# Patient Record
Sex: MALE | Race: BLACK OR AFRICAN AMERICAN | Employment: OTHER | URBAN - METROPOLITAN AREA
[De-identification: names, ages, dates, MRNs, and addresses within clinical notes are randomized per-mention and may not be internally consistent; named-entity substitution may affect disease eponyms.]

---

## 2017-01-01 ENCOUNTER — HOSPITAL ENCOUNTER (EMERGENCY)
Age: 82
Discharge: HOME | End: 2017-01-01
Attending: EMERGENCY MEDICINE
Payer: MEDICARE

## 2017-01-01 ENCOUNTER — APPOINTMENT (OUTPATIENT)
Dept: GENERAL RADIOLOGY | Age: 82
End: 2017-01-01
Payer: MEDICARE

## 2017-01-01 VITALS
WEIGHT: 192 LBS | HEIGHT: 67 IN | SYSTOLIC BLOOD PRESSURE: 178 MMHG | BODY MASS INDEX: 30.13 KG/M2 | RESPIRATION RATE: 18 BRPM | TEMPERATURE: 97.7 F | DIASTOLIC BLOOD PRESSURE: 88 MMHG | HEART RATE: 72 BPM | OXYGEN SATURATION: 99 %

## 2017-01-01 DIAGNOSIS — K94.23 PEG TUBE MALFUNCTION (HCC): Primary | ICD-10-CM

## 2017-01-01 PROCEDURE — 77030005103 HC CATH GASTMY BLN HALY -B

## 2017-01-01 PROCEDURE — 99284 EMERGENCY DEPT VISIT MOD MDM: CPT | Performed by: PHYSICIAN ASSISTANT

## 2017-01-01 PROCEDURE — 75810000109 HC GASTRO TUBE CHANGE: Performed by: PHYSICIAN ASSISTANT

## 2017-01-01 PROCEDURE — 74011636320 HC RX REV CODE- 636/320: Performed by: EMERGENCY MEDICINE

## 2017-01-01 PROCEDURE — 74000 XR ABD (KUB): CPT

## 2017-01-01 RX ADMIN — DIATRIZOATE MEGLUMINE AND DIATRIZOATE SODIUM 30 ML: 660; 100 LIQUID ORAL; RECTAL at 11:16

## 2017-01-01 NOTE — ED PROVIDER NOTES
HPI Comments: Patient presents from [facility] for PEG tube replacement. They state the old one is leaking. Patient is not having any fevers, nausea, vomiting, abdominal pain, abdominal distention or other symptoms. He is brought by EMS. His tube was initially placed by Dr. Precious Mendez 86/29/68 for poor PO intake. Patient is a 80 y.o. male presenting with feeding tube problem. The history is provided by the patient. 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 No pertinent family history. Social History     Social History    Marital status:      Spouse name: N/A    Number of children: N/A    Years of education: N/A     Occupational History    Not on file. Social History Main Topics    Smoking status: Never Smoker    Smokeless tobacco: Not on file    Alcohol use No    Drug use: No    Sexual activity: Not on file     Other Topics Concern    Not on file     Social History Narrative         ALLERGIES: Review of patient's allergies indicates no known allergies. Review of Systems   Constitutional: Negative. HENT: Negative. Eyes: Negative. Respiratory: Negative. Cardiovascular: Negative. Gastrointestinal: Negative. Genitourinary: Negative. Musculoskeletal: Negative. Skin: Negative. Neurological: Negative. Psychiatric/Behavioral: Negative. All other systems reviewed and are negative. Vitals:    01/01/17 1050   BP: (!) 185/92   Pulse: 70   Resp: 20   Temp: 97.7 °F (36.5 °C)   SpO2: 96%   Weight: 87.1 kg (192 lb)   Height: 5' 7\" (1.702 m)            Physical Exam   Constitutional: He is oriented to person, place, and time. He appears well-developed and well-nourished. HENT:   Head: Normocephalic and atraumatic. Right Ear: External ear normal.   Left Ear: External ear normal.   Nose: Nose normal.   Mouth/Throat: Oropharynx is clear and moist.   Eyes: Conjunctivae and EOM are normal. Pupils are equal, round, and reactive to light. Neck: Normal range of motion. Neck supple. Cardiovascular: Normal rate, regular rhythm, normal heart sounds and intact distal pulses. Pulmonary/Chest: Effort normal and breath sounds normal.   Abdominal: Soft. Normal appearance and bowel sounds are normal. There is no tenderness. There is no rigidity, no rebound, no guarding, no tenderness at McBurney's point and negative Doherty's sign. Musculoskeletal: Normal range of motion. Neurological: He is alert and oriented to person, place, and time. He has normal reflexes. Skin: Skin is warm and dry. Psychiatric: He has a normal mood and affect. His behavior is normal. Judgment and thought content normal.   Nursing note and vitals reviewed.        MDM  Number of Diagnoses or Management Options  Risk of Complications, Morbidity, and/or Mortality  Presenting problems: low  Diagnostic procedures: low  Management options: low      ED Course       Other Procedure  Date/Time: 1/1/2017 11:08 AM  Performed by: Uli Mendoza  Authorized by: Иван Leahy 18 [place] PEG tube removed without difficulty after balloon deflated and new PEG tube placed without difficulty. 10 ml sterile water used to inflate balloon and tube perfused with 10 ml saline without leakage, gastric contents also withdrawn without difficulty. XR KUB with gastrograffin ordered to document placement. Taisha Mcnamara RN into assist with replacement. He removed the old tube. The patient was observed in the ED. Results Reviewed:  XR ABD (KUB)   Final Result   IMPRESSION: PEG tip in the distal stomach. I discussed the results of all labs, procedures, radiographs, and treatments with the patient and available family. Treatment plan is agreed upon and the patient is ready for discharge. All voiced understanding of the discharge plan and medication instructions or changes as appropriate. Questions about treatment in the ED were answered. All were encouraged to return should symptoms worsen or new problems develop.

## 2017-01-01 NOTE — DISCHARGE INSTRUCTIONS
Percutaneous Endoscopic Gastrostomy: What to Expect at [address] [facility]  A percutaneous endoscopic gastrostomy (PEG) is a procedure to make an opening between the skin of your belly and your stomach. The doctor put a thin tube called a gastrostomy tube (G-tube or feeding tube) into your stomach through the opening. The tube can put liquid nutrition, fluid, and medicines directly into your stomach. The tube also may be used to drain liquid or air from the stomach. Your belly may feel sore, like you pulled a muscle, for several days. Your doctor will give you pain medicine for this. It will take about a week for the skin around your feeding tube to heal. You may have some yellowish mucus where the feeding tube comes out of your belly. This is normal and is not a sign of infection. You will need to learn how to use and care for your feeding tube. Your doctor may recommend that you have a nurse or dietitian visit you at home to help you get started with your feeding tube. At first you may need a friend or family member to help you with your tube feedings. But with practice, you may be able to do it yourself. This care sheet gives you a general idea about how long it will take for you to recover. But each person recovers at a different pace. Follow the steps below to feel better as quickly as possible. How can you care for yourself at home? Activity  · Rest when you feel tired. Getting enough sleep will help you recover. · Try to walk each day. Start by walking a little more than you did the day before. Bit by bit, increase the amount you walk. Walking boosts blood flow and helps prevent pneumonia and constipation. · Ask your doctor when you can drive again. · Until your doctor says it is okay, avoid lifting anything that would make you strain. This may include heavy grocery bags and milk containers, a heavy briefcase or backpack, cat litter or dog food bags, a vacuum , or a child.   · You can shower as usual. Pat dry the skin around your feeding tube. Do not take a bath unless your doctor tells you it is okay. Diet  · Follow your doctor's instructions about eating and drinking. · Follow your doctor's instructions about what nutrition and fluids to feed through your tube. Medicines  · Your doctor will tell you if and when you can restart your medicines. He or she will also give you instructions about taking any new medicines. · If you take blood thinners, such as warfarin (Coumadin), clopidogrel (Plavix), or aspirin, be sure to talk to your doctor. He or she will tell you if and when to start taking those medicines again. Make sure that you understand exactly what your doctor wants you to do. · If you take medicine through your feeding tube:  ¨ Follow exactly your doctor's instructions about how to do this. Do not try to put whole pills in your feeding tube. They may get stuck. Ask your doctor if liquid medicine is available. ¨ Do not mix your medicine with tube-feeding formula. This can cause a clog in the feeding tube. ¨ Do not put more than one medicine down your feeding tube at a time. ¨ Flush the tube with water before and after you put each medicine down your tube. · Be safe with medicines. Take pain medicines exactly as directed. ¨ If the doctor gave you a prescription medicine for pain, take it as prescribed. ¨ If you are not taking a prescription pain medicine, ask your doctor if you can take an over-the-counter medicine. ¨ Do not take two or more pain medicines at the same time unless the doctor told you to. Many pain medicines have acetaminophen, which is Tylenol. Too much acetaminophen (Tylenol) can be harmful. · If you think your pain medicine is making you sick to your stomach:  ¨ Take your pain medicine after meals (unless your doctor has told you not to). ¨ Ask your doctor for a different pain medicine. · If your doctor prescribed antibiotics, take them as directed.  Do not stop taking them just because you feel better. You need to take the full course of antibiotics. Incision care  · Your doctor or nurse will show you how to care for the skin around the tube. Be sure to follow the instructions on keeping the area clean. · Wash the skin around your feeding tube daily with warm, soapy water, and pat it dry. Other cleaning products, such as hydrogen peroxide, can make the wound heal more slowly. You may cover the area with a gauze bandage if it weeps or rubs against clothing. Change the bandage every day. · Keep the area clean and dry. Using your feeding tube  · Follow your doctor's instructions about using the feeding tube. Your doctor or nurse will:  Mike Zayas you what to put through the tube. ¨ Teach you how to watch for infection at the tube site or for a clog in the tube. ¨ Tell you what activities you can do. · Keep your feeding tube clamped unless you are using it. · Keep the tube taped to your skin at all times, and leave some slack in the tube. This helps prevent pain and keeps the tube from coming out. · Wash your hands before you handle the tube and tube-feeding formula. Wash the top of the can of formula before you open it. · Keep the formula in the refrigerator after you open it. Do not let the formula sit at room temperature for more than 8 hours. · Sit up or keep your head up during the feeding and for 30 minutes after. · If you feel sick to your stomach or have stomach cramps during the tube feeding, slow the rate that the formula comes through the tube. Then gradually increase the rate as you can tolerate it. Follow-up care is a key part of your treatment and safety. Be sure to make and go to all appointments, and call your doctor if you are having problems. It's also a good idea to know your test results and keep a list of the medicines you take. When should you call for help? Call 911 anytime you think you may need emergency care.  For example, call if:  · You pass out (lose consciousness). · You have severe trouble breathing. · You have sudden chest pain and shortness of breath, or you cough up blood. · You have severe belly pain. Call your doctor now or seek immediate medical care if:  · You have pain that does not get better after you take pain medicine. · You have a fever over 100°F.  · You have signs of infection, such as:  ¨ Increased pain, swelling, warmth, or redness. ¨ Red streaks leading from the area. ¨ Pus draining from the area. ¨ Swollen lymph nodes in your neck, armpits, or groin. ¨ A fever. · The stitches that hold the feeding tube in place are loose or come out. · Your feeding tube comes out. · Your feeding tube is clogged, or liquid will not go down the tube. · You cough a lot or have other trouble during tube feedings. · You have nausea, vomiting, or diarrhea. Watch closely for any changes in your health, and be sure to contact your doctor if:  · You have any problems with your feeding tube. Where can you learn more? Go to http://sam-darwin.info/. Enter A524 in the search box to learn more about \"Percutaneous Endoscopic Gastrostomy: What to Expect at Home. \"  Current as of: August 9, 2016  Content Version: 11.1  © 9981-3392 [employer]. Care instructions adapted under license by [employer] (which disclaims liability or warranty for this information). If you have questions about a medical condition or this instruction, always ask your healthcare professional. [place] [address] any warranty or liability for your use of this information.

## 2017-03-02 ENCOUNTER — APPOINTMENT (OUTPATIENT)
Dept: GENERAL RADIOLOGY | Age: 82
DRG: 189 | End: 2017-03-02
Attending: EMERGENCY MEDICINE
Payer: MEDICARE

## 2017-03-02 ENCOUNTER — HOSPITAL ENCOUNTER (INPATIENT)
Age: 82
LOS: 4 days | Discharge: NURSING, REHAB OR LONG-TERM CARE | DRG: 189 | End: 2017-03-06
Attending: EMERGENCY MEDICINE | Admitting: HOSPITALIST
Payer: MEDICARE

## 2017-03-02 DIAGNOSIS — S91.302A OPEN WOUND OF LEFT HEEL, INITIAL ENCOUNTER: ICD-10-CM

## 2017-03-02 DIAGNOSIS — E87.0 HYPERNATREMIA: Primary | ICD-10-CM

## 2017-03-02 PROBLEM — R09.02 HYPOXIA: Status: ACTIVE | Noted: 2017-03-02

## 2017-03-02 PROBLEM — R74.01 TRANSAMINITIS: Status: ACTIVE | Noted: 2017-03-02

## 2017-03-02 PROBLEM — J96.01 ACUTE RESPIRATORY FAILURE WITH HYPOXIA (HCC): Status: ACTIVE | Noted: 2017-03-02

## 2017-03-02 PROBLEM — E88.09 HYPOALBUMINEMIA: Status: ACTIVE | Noted: 2017-03-02

## 2017-03-02 PROBLEM — R06.03 ACUTE RESPIRATORY DISTRESS: Status: ACTIVE | Noted: 2017-03-02

## 2017-03-02 LAB
ALBUMIN SERPL BCP-MCNC: 2.2 G/DL (ref 3.2–4.6)
ALBUMIN/GLOB SERPL: 0.4 {RATIO} (ref 1.2–3.5)
ALP SERPL-CCNC: 288 U/L (ref 50–136)
ALT SERPL-CCNC: 257 U/L (ref 12–65)
ANION GAP BLD CALC-SCNC: 4 MMOL/L (ref 7–16)
AST SERPL W P-5'-P-CCNC: 220 U/L (ref 15–37)
ATRIAL RATE: 394 BPM
BASOPHILS # BLD AUTO: 0 K/UL (ref 0–0.2)
BASOPHILS # BLD: 0 % (ref 0–2)
BILIRUB SERPL-MCNC: 0.5 MG/DL (ref 0.2–1.1)
BNP SERPL-MCNC: 184 PG/ML
BUN SERPL-MCNC: 33 MG/DL (ref 8–23)
CALCIUM SERPL-MCNC: 9.7 MG/DL (ref 8.3–10.4)
CALCULATED P AXIS, ECG09: NORMAL DEGREES
CALCULATED R AXIS, ECG10: 61 DEGREES
CALCULATED T AXIS, ECG11: -10 DEGREES
CHLORIDE SERPL-SCNC: 113 MMOL/L (ref 98–107)
CO2 SERPL-SCNC: 39 MMOL/L (ref 21–32)
CREAT SERPL-MCNC: 0.93 MG/DL (ref 0.8–1.5)
DIAGNOSIS, 93000: NORMAL
DIASTOLIC BP, ECG02: NORMAL MMHG
DIFFERENTIAL METHOD BLD: ABNORMAL
EOSINOPHIL # BLD: 0.1 K/UL (ref 0–0.8)
EOSINOPHIL NFR BLD: 1 % (ref 0.5–7.8)
ERYTHROCYTE [DISTWIDTH] IN BLOOD BY AUTOMATED COUNT: 17.3 % (ref 11.9–14.6)
FLUAV AG NPH QL IA: NEGATIVE
FLUBV AG NPH QL IA: NEGATIVE
GLOBULIN SER CALC-MCNC: 5.1 G/DL (ref 2.3–3.5)
GLUCOSE SERPL-MCNC: 98 MG/DL (ref 65–100)
HCT VFR BLD AUTO: 37.1 % (ref 41.1–50.3)
HGB BLD-MCNC: 11.3 G/DL (ref 13.6–17.2)
IMM GRANULOCYTES # BLD: 0 K/UL (ref 0–0.5)
IMM GRANULOCYTES NFR BLD AUTO: 0.1 % (ref 0–5)
LACTATE BLD-SCNC: 1.1 MMOL/L (ref 0.5–1.9)
LYMPHOCYTES # BLD AUTO: 16 % (ref 13–44)
LYMPHOCYTES # BLD: 1.3 K/UL (ref 0.5–4.6)
MCH RBC QN AUTO: 29.7 PG (ref 26.1–32.9)
MCHC RBC AUTO-ENTMCNC: 30.5 G/DL (ref 31.4–35)
MCV RBC AUTO: 97.6 FL (ref 79.6–97.8)
MONOCYTES # BLD: 0.8 K/UL (ref 0.1–1.3)
MONOCYTES NFR BLD AUTO: 10 % (ref 4–12)
NEUTS SEG # BLD: 5.9 K/UL (ref 1.7–8.2)
NEUTS SEG NFR BLD AUTO: 73 % (ref 43–78)
P-R INTERVAL, ECG05: NORMAL MS
PLATELET # BLD AUTO: 128 K/UL (ref 150–450)
PMV BLD AUTO: 11.7 FL (ref 10.8–14.1)
POTASSIUM SERPL-SCNC: 3.9 MMOL/L (ref 3.5–5.1)
PROCALCITONIN SERPL-MCNC: 0.1 NG/ML
PROT SERPL-MCNC: 7.3 G/DL (ref 6.3–8.2)
Q-T INTERVAL, ECG07: 390 MS
QRS DURATION, ECG06: 78 MS
QTC CALCULATION (BEZET), ECG08: 408 MS
RBC # BLD AUTO: 3.8 M/UL (ref 4.23–5.67)
SODIUM SERPL-SCNC: 156 MMOL/L (ref 136–145)
SODIUM SERPL-SCNC: 158 MMOL/L (ref 136–145)
SYSTOLIC BP, ECG01: NORMAL MMHG
TROPONIN I SERPL-MCNC: 0.06 NG/ML (ref 0.02–0.05)
TROPONIN I SERPL-MCNC: 0.07 NG/ML (ref 0.02–0.05)
VENTRICULAR RATE, ECG03: 66 BPM
WBC # BLD AUTO: 8.1 K/UL (ref 4.3–11.1)

## 2017-03-02 PROCEDURE — 83605 ASSAY OF LACTIC ACID: CPT

## 2017-03-02 PROCEDURE — 71010 XR CHEST PORT: CPT

## 2017-03-02 PROCEDURE — 93005 ELECTROCARDIOGRAM TRACING: CPT | Performed by: EMERGENCY MEDICINE

## 2017-03-02 PROCEDURE — 87040 BLOOD CULTURE FOR BACTERIA: CPT

## 2017-03-02 PROCEDURE — 51701 INSERT BLADDER CATHETER: CPT | Performed by: EMERGENCY MEDICINE

## 2017-03-02 PROCEDURE — 74011000258 HC RX REV CODE- 258: Performed by: EMERGENCY MEDICINE

## 2017-03-02 PROCEDURE — 65270000029 HC RM PRIVATE

## 2017-03-02 PROCEDURE — 74011250636 HC RX REV CODE- 250/636: Performed by: EMERGENCY MEDICINE

## 2017-03-02 PROCEDURE — 99285 EMERGENCY DEPT VISIT HI MDM: CPT | Performed by: EMERGENCY MEDICINE

## 2017-03-02 PROCEDURE — 87077 CULTURE AEROBIC IDENTIFY: CPT | Performed by: EMERGENCY MEDICINE

## 2017-03-02 PROCEDURE — 84145 PROCALCITONIN (PCT): CPT | Performed by: HOSPITALIST

## 2017-03-02 PROCEDURE — 83880 ASSAY OF NATRIURETIC PEPTIDE: CPT | Performed by: EMERGENCY MEDICINE

## 2017-03-02 PROCEDURE — 85025 COMPLETE CBC W/AUTO DIFF WBC: CPT | Performed by: EMERGENCY MEDICINE

## 2017-03-02 PROCEDURE — 87205 SMEAR GRAM STAIN: CPT | Performed by: EMERGENCY MEDICINE

## 2017-03-02 PROCEDURE — 84295 ASSAY OF SERUM SODIUM: CPT

## 2017-03-02 PROCEDURE — 36415 COLL VENOUS BLD VENIPUNCTURE: CPT

## 2017-03-02 PROCEDURE — 80053 COMPREHEN METABOLIC PANEL: CPT | Performed by: EMERGENCY MEDICINE

## 2017-03-02 PROCEDURE — 74011000250 HC RX REV CODE- 250

## 2017-03-02 PROCEDURE — 74011250636 HC RX REV CODE- 250/636

## 2017-03-02 PROCEDURE — 87804 INFLUENZA ASSAY W/OPTIC: CPT | Performed by: EMERGENCY MEDICINE

## 2017-03-02 PROCEDURE — 74011000258 HC RX REV CODE- 258

## 2017-03-02 PROCEDURE — 87186 SC STD MICRODIL/AGAR DIL: CPT | Performed by: EMERGENCY MEDICINE

## 2017-03-02 PROCEDURE — 84484 ASSAY OF TROPONIN QUANT: CPT | Performed by: EMERGENCY MEDICINE

## 2017-03-02 PROCEDURE — 73630 X-RAY EXAM OF FOOT: CPT

## 2017-03-02 PROCEDURE — 65660000000 HC RM CCU STEPDOWN

## 2017-03-02 RX ORDER — OMEPRAZOLE 20 MG/1
20 CAPSULE, DELAYED RELEASE ORAL DAILY
COMMUNITY
End: 2017-03-06

## 2017-03-02 RX ORDER — TROSPIUM CHLORIDE 20 MG/1
20 TABLET, FILM COATED ORAL
COMMUNITY
End: 2017-03-06

## 2017-03-02 RX ORDER — TRAZODONE HYDROCHLORIDE 50 MG/1
25 TABLET ORAL
COMMUNITY
End: 2017-03-06

## 2017-03-02 RX ORDER — SODIUM CHLORIDE 0.9 % (FLUSH) 0.9 %
5-10 SYRINGE (ML) INJECTION EVERY 8 HOURS
Status: DISCONTINUED | OUTPATIENT
Start: 2017-03-02 | End: 2017-03-05 | Stop reason: ALTCHOICE

## 2017-03-02 RX ORDER — ONDANSETRON 2 MG/ML
4 INJECTION INTRAMUSCULAR; INTRAVENOUS
Status: DISCONTINUED | OUTPATIENT
Start: 2017-03-02 | End: 2017-03-05 | Stop reason: ALTCHOICE

## 2017-03-02 RX ORDER — GUAIFENESIN 100 MG/5ML
81 LIQUID (ML) ORAL DAILY
COMMUNITY
End: 2017-03-06

## 2017-03-02 RX ORDER — TAMSULOSIN HYDROCHLORIDE 0.4 MG/1
0.4 CAPSULE ORAL DAILY
COMMUNITY
End: 2017-03-06

## 2017-03-02 RX ORDER — SODIUM CHLORIDE 0.9 % (FLUSH) 0.9 %
5-10 SYRINGE (ML) INJECTION AS NEEDED
Status: DISCONTINUED | OUTPATIENT
Start: 2017-03-02 | End: 2017-03-05 | Stop reason: ALTCHOICE

## 2017-03-02 RX ORDER — DEXTROSE MONOHYDRATE 50 MG/ML
100 INJECTION, SOLUTION INTRAVENOUS CONTINUOUS
Status: DISCONTINUED | OUTPATIENT
Start: 2017-03-02 | End: 2017-03-04

## 2017-03-02 RX ORDER — ACETAMINOPHEN 325 MG/1
650 TABLET ORAL
Status: DISCONTINUED | OUTPATIENT
Start: 2017-03-02 | End: 2017-03-05 | Stop reason: ALTCHOICE

## 2017-03-02 RX ORDER — DOCUSATE SODIUM 100 MG/1
100 CAPSULE, LIQUID FILLED ORAL 2 TIMES DAILY
COMMUNITY
End: 2017-03-06

## 2017-03-02 RX ORDER — HEPARIN SODIUM 5000 [USP'U]/ML
5000 INJECTION, SOLUTION INTRAVENOUS; SUBCUTANEOUS EVERY 8 HOURS
Status: DISCONTINUED | OUTPATIENT
Start: 2017-03-02 | End: 2017-03-05 | Stop reason: ALTCHOICE

## 2017-03-02 RX ADMIN — CLINDAMYCIN PHOSPHATE 600 MG: 150 INJECTION, SOLUTION, CONCENTRATE INTRAVENOUS at 21:05

## 2017-03-02 RX ADMIN — HEPARIN SODIUM 5000 UNITS: 5000 INJECTION, SOLUTION INTRAVENOUS; SUBCUTANEOUS at 21:05

## 2017-03-02 RX ADMIN — CEFTRIAXONE SODIUM 1 G: 1 INJECTION, POWDER, FOR SOLUTION INTRAMUSCULAR; INTRAVENOUS at 18:30

## 2017-03-02 RX ADMIN — SODIUM CHLORIDE 500 ML: 900 INJECTION, SOLUTION INTRAVENOUS at 17:55

## 2017-03-02 RX ADMIN — DEXTROSE MONOHYDRATE 100 ML/HR: 5 INJECTION, SOLUTION INTRAVENOUS at 21:05

## 2017-03-02 RX ADMIN — Medication 10 ML: at 22:00

## 2017-03-02 NOTE — ED PROVIDER NOTES
HPI Comments: Patient with history of high blood pressure and heart disease. Also a prior stroke. He has congestive heart failure and chronic kidney disease with edema. Presents with cough and shortness of breath from the nursing home. 150 mL of fluid was suctioned out. Patient is slightly improved after that. He has been nonverbal for some time now. He rarely opens his eyes. He has swelling all over. Patient is a 80 y.o. male presenting with shortness of breath. The history is provided by the patient. No  was used. Shortness of Breath   This is a new problem. The problem occurs continuously. The current episode started 3 to 5 hours ago. The problem has not changed since onset. Associated symptoms include cough and leg swelling. He has tried nothing for the symptoms. Associated medical issues include CAD and heart failure. Past Medical History:   Diagnosis Date    BPH (benign prostatic hypertrophy)     CAD (coronary artery disease)     CKD (chronic kidney disease)     Edema     Heart failure (HCC)     Hypertension     Myocardial infarction (HCC)     Other ill-defined conditions(799.89)     enlarged prostate    Psychiatric disorder     vascular dementia    Psychiatric disorder     encephalopathy     Psychiatric disorder     Delirium    Seizures ([address]     St[address]     L sided residual       Past Surgical History:   Procedure Laterality Date    CARDIAC SURG PROCEDURE UNLIST      stents    HX CHOLECYSTECTOMY      HX GI      cholecystectomy    HX HEENT      Cataract surgery    HX HEMORRHOIDECTOMY      HX LIPOMA RESECTION      HX ORTHOPAEDIC      neck    HX OTHER SURGICAL      hemmorhoids         No family history on file. Social History     Social History    Marital status:      Spouse name: N/A    Number of children: N/A    Years of education: N/A     Occupational History    Not on file.      Social History Main Topics    Smoking status: Never Smoker    Smokeless tobacco: Not on file    Alcohol use No    Drug use: No    Sexual activity: Not on file     Other Topics Concern    Not on file     Social History Narrative         ALLERGIES: Review of patient's allergies indicates no known allergies. Review of Systems   Unable to perform ROS: Patient nonverbal   Respiratory: Positive for cough and shortness of breath. Cardiovascular: Positive for leg swelling. Vitals:    03/02/17 1509   BP: 106/79   Pulse: 60   Resp: 20   Temp: 97.6 °F (36.4 °C)   Weight: 87.1 kg (192 lb)   Height: 5' 7\" (1.702 m)            Physical Exam   Constitutional: He appears well-developed and well-nourished. HENT:   Head: Normocephalic and atraumatic. Eyes:   Conjunctival edema     Neck: Neck supple. Cardiovascular: Normal rate and regular rhythm. Pulmonary/Chest: Effort normal. He has no wheezes. Coarse bilaterally     Abdominal: Soft. Bowel sounds are normal. He exhibits distension (mild). There is no tenderness. Musculoskeletal: He exhibits edema. He exhibits no tenderness. Lymphadenopathy:     He has no cervical adenopathy. Neurological: He is disoriented. Non verbal     Skin: Skin is warm and dry. Nursing note and vitals reviewed. MDM  Number of Diagnoses or Management Options  Diagnosis management comments: Pt with SOB and cough with fluid on lungs along with a left heel wound with foul odor. Hypernatremia on lab will admit. Amount and/or Complexity of Data Reviewed  Clinical lab tests: ordered and reviewed  Tests in the radiology section of CPT®: ordered and reviewed  Tests in the medicine section of CPT®: ordered and reviewed    Patient Progress  Patient progress: stable    ED Course       Procedures      EKG: normal sinus rhythm, nonspecific ST and T waves changes. Rate 66.      XR CHEST PORT (Final result) Result time: 03/02/17 15:36:55     Final result by Cuauhtemoc De Luna MD (03/02/17 15:36:55)     Impression:     Impression:  Small lung volumes with basilar atelectasis.             Narrative:     Portable Chest  X-ray  3/2/2017 at 1512 hours    Indication: Shortness of breath    Comparison:  12/28/2016    Findings:  Portable AP view demonstrates small lung volumes. Left greater than  right basilar atelectasis. No pulmonary edema. Heart size stable. Results Include:    Recent Results (from the past 24 hour(s))   CBC WITH AUTOMATED DIFF    Collection Time: 03/02/17  3:10 PM   Result Value Ref Range    WBC 8.1 4.3 - 11.1 K/uL    RBC 3.80 (L) 4.23 - 5.67 M/uL    HGB 11.3 (L) 13.6 - 17.2 g/dL    HCT 37.1 (L) 41.1 - 50.3 %    MCV 97.6 79.6 - 97.8 FL    MCH 29.7 26.1 - 32.9 PG    MCHC 30.5 (L) 31.4 - 35.0 g/dL    RDW 17.3 (H) 11.9 - 14.6 %    PLATELET 012 (L) 866 - 450 K/uL    MPV 11.7 10.8 - 14.1 FL    DF AUTOMATED      NEUTROPHILS 73 43 - 78 %    LYMPHOCYTES 16 13 - 44 %    MONOCYTES 10 4.0 - 12.0 %    EOSINOPHILS 1 0.5 - 7.8 %    BASOPHILS 0 0.0 - 2.0 %    IMMATURE GRANULOCYTES 0.1 0.0 - 5.0 %    ABS. NEUTROPHILS 5.9 1.7 - 8.2 K/UL    ABS. LYMPHOCYTES 1.3 0.5 - 4.6 K/UL    ABS. MONOCYTES 0.8 0.1 - 1.3 K/UL    ABS. EOSINOPHILS 0.1 0.0 - 0.8 K/UL    ABS. BASOPHILS 0.0 0.0 - 0.2 K/UL    ABS. IMM. GRANS. 0.0 0.0 - 0.5 K/UL   METABOLIC PANEL, COMPREHENSIVE    Collection Time: 03/02/17  3:10 PM   Result Value Ref Range    Sodium 156 (H) 136 - 145 mmol/L    Potassium 3.9 3.5 - 5.1 mmol/L    Chloride 113 (H) 98 - 107 mmol/L    CO2 39 (H) 21 - 32 mmol/L    Anion gap 4 (L) 7 - 16 mmol/L    Glucose 98 65 - 100 mg/dL    BUN 33 (H) 8 - 23 MG/DL    Creatinine 0.93 0.8 - 1.5 MG/DL    GFR est AA >60 >60 ml/min/1.73m2    GFR est non-AA >60 >60 ml/min/1.73m2    Calcium 9.7 8.3 - 10.4 MG/DL    Bilirubin, total 0.5 0.2 - 1.1 MG/DL    ALT (SGPT) 257 (H) 12 - 65 U/L    AST (SGOT) 220 (H) 15 - 37 U/L    Alk.  phosphatase 288 (H) 50 - 136 U/L    Protein, total 7.3 6.3 - 8.2 g/dL    Albumin 2.2 (L) 3.2 - 4.6 g/dL    Globulin 5.1 (H) 2.3 - 3.5 g/dL A-G Ratio 0.4 (L) 1.2 - 3.5     TROPONIN I    Collection Time: 03/02/17  3:10 PM   Result Value Ref Range    Troponin-I, Qt. 0.07 (H) 0.02 - 0.05 NG/ML   BNP    Collection Time: 03/02/17  3:10 PM   Result Value Ref Range     pg/mL   EKG, 12 LEAD, INITIAL    Collection Time: 03/02/17  3:15 PM   Result Value Ref Range    Systolic BP  mmHg    Diastolic BP  mmHg    Ventricular Rate 66 BPM    Atrial Rate 394 BPM    P-R Interval  ms    QRS Duration 78 ms    Q-T Interval 390 ms    QTC Calculation (Bezet) 408 ms    Calculated P Axis  degrees    Calculated R Axis 61 degrees    Calculated T Axis -10 degrees    Diagnosis       Normal sinus rhythm  nonspecific Twave change inferiorly  When compared with ECG of 28-DEC-2016 11:17,  No significant change was found    Confirmed by CHRISTIANO HOUSER (), PREMA MERIDA ([id number]) on 3/2/2017 4:25:27 PM     POC LACTIC ACID    Collection Time: 03/02/17  4:23 PM   Result Value Ref Range    Lactic Acid (POC) 1.1 0.5 - 1.9 mmol/L   INFLUENZA A & B AG (RAPID TEST)    Collection Time: 03/02/17  4:30 PM   Result Value Ref Range    Influenza A Ag NEGATIVE  NEG      Influenza B Ag NEGATIVE  NEG              XR FOOT LT MIN 3 V (Final result) Result time: 03/02/17 16:45:26     Final result by Ericka Anderson MD (03/02/17 16:45:26)     Impression:     IMPRESSION: Soft tissue swelling. No acute bony findings.     Narrative:     Portable left foot 3/2/2017 1621 hours    HISTORY: Wound. No additional history is provided. FINDINGS: 3 views. Soft tissue swelling overlies the metatarsals. Normal bony  alignment with intact cortical margins.  No destructive lesions.                 XR CHEST PORT (Final result) Result time: 03/02/17 15:36:55     Final result by Ericka Anderson MD (03/02/17 15:36:55)     Impression:     Impression:  Small lung volumes with basilar atelectasis.             Narrative:     Portable Chest  X-ray  3/2/2017 at 1512 hours    Indication: Shortness of breath    Comparison:  12/28/2016    Findings:  Portable AP view demonstrates small lung volumes. Left greater than  right basilar atelectasis. No pulmonary edema.  Heart size stable.

## 2017-03-02 NOTE — ED NOTES
PAtient;s mouth suctioned after he attempted to cough. Noted to have what looks to be chunks of food in suctioned out sputum. Dr Doe Fraserf aware. Wife states she does not believe anyone would have fed him.

## 2017-03-02 NOTE — ED NOTES
TRANSFER - OUT REPORT:    Verbal report given on Phillips Cooks  being transferred to 837(unit) for routine progression of care       Report consisted of patients Situation, Background, Assessment and   Recommendations(SBAR). Information from the following report(s) SBAR, ED Summary, [facility] and Recent Results was reviewed with the receiving nurse. Lines:   Peripheral IV 03/02/17 Right Antecubital (Active)   Site Assessment Clean, dry, & intact 3/2/2017  3:54 PM   Phlebitis Assessment 0 3/2/2017  3:54 PM   Infiltration Assessment 0 3/2/2017  3:54 PM   Dressing Status Clean, dry, & intact 3/2/2017  3:54 PM   Hub Color/Line Status Pink 3/2/2017  3:54 PM        Opportunity for questions and clarification was provided.       Patient transported with:   O2 @ 2 liters

## 2017-03-02 NOTE — ED TRIAGE NOTES
Per ems called out to [address] [facility] for respiratory distress, patient 94% room, patient suctioned 150ml clear fluids. Patient normally non verbal c02 40, [id number] upon triage. Patient active dnr

## 2017-03-02 NOTE — H&P
HOSPITALIST H&P  NAME:  Opal Bai   Age:  80 y.o.  :   3/31/1921   MRN:   [id number]  PCP: Not On File [facility]  Treatment Team: Attending Provider: Manjit Starr MD; Primary Nurse: Mariana Alvarez RN    HPI:   Opal Bai is a 80 y.o. male that presented from nursing facility with report of respiratory distress and congestion requiring suctioning. He is noted to have hypoxia with SpO2 of 88% on RA. CXR detailed below. He has received Rocephin in the ED. Patient is non-verbal at baseline per wife. The hospitalist have been asked to admit. Results summary of Diagnostic Studies/Procedures copied from within [facility] EMR:  Portable Chest X-ray 3/2/2017 at 1512 hours  Indication: Shortness of breath  Comparison: 2016  Findings: Portable AP view demonstrates small lung volumes. Left greater than  right basilar atelectasis. No pulmonary edema. Heart size stable. IMPRESSION  Impression: Small lung volumes with basilar atelectasis. Portable left foot 3/2/2017 1621 hours  HISTORY: Wound. No additional history is provided. FINDINGS: 3 views. Soft tissue swelling overlies the metatarsals. Normal bony  alignment with intact cortical margins. No destructive lesions. IMPRESSION: Soft tissue swelling.  No acute bony findings    Complete ROS done and is as stated in HPI or otherwise negative  Past Medical History:   Diagnosis Date    BPH (benign prostatic hypertrophy)     CAD (coronary artery disease)     CKD (chronic kidney disease)     Edema     Heart failure (HCC)     Hypertension     Myocardial infarction ([address]     Other ill-defined conditions(799.89)     enlarged prostate    Psychiatric disorder     vascular dementia    Psychiatric disorder     encephalopathy     Psychiatric disorder     Delirium    Seizures ([address]     Stroke ([address]     L sided residual      Past Surgical History:   Procedure Laterality Date    CARDIAC SURG PROCEDURE UNLIST      stents    HX CHOLECYSTECTOMY      HX GI      cholecystectomy    HX HEENT      Cataract surgery    HX HEMORRHOIDECTOMY      HX LIPOMA RESECTION      HX ORTHOPAEDIC      neck    HX OTHER SURGICAL      hemmorhoids      Prior to Admission Medications   Prescriptions Last Dose Informant Patient Reported? Taking?   acetaminophen (TYLENOL) 500 mg tablet   No No   Si Tabs by Per G Tube route every six (6) hours as needed for Pain or Fever. amLODIPine (NORVASC) 5 mg tablet   No No   Si Tab by Per G Tube route daily. Indications: Hypertension   carboxymethylcellulose sodium (CELLUVISC) 0.5 % drop ophthalmic solution   Yes No   Sig: Administer 1 Drop to both eyes two (2) times a day. Indications: DRY EYE   carvedilol (COREG) 12.5 mg tablet   No No   Si Tab by Per G Tube route two (2) times daily (with meals). cloNIDine (CATAPRES) 0.3 mg/24 hr   No No   Si Patch by TransDERmal route every seven (7) days. clopidogrel (PLAVIX) 75 mg tab   No No   Si Tab by PEG Tube route daily. dextran 70-hypromellose (ARTIFICIAL TEARS,[id number],) ophthalmic solution   Yes No   Sig: Administer 1 Drop to both eyes three (3) times daily as needed. finasteride (PROSCAR) 5 mg tablet   No No   Sig: Take 1 Tab by mouth daily. Indications: SYMPTOMATIC BENIGN PROSTATIC HYPERPLASIA   levETIRAcetam (KEPPRA) 100 mg/ml soln oral solution   No No   Si mL by Per G Tube route two (2) times a day. losartan (COZAAR) 50 mg tablet   No No   Si Tab by Per G Tube route daily.       Facility-Administered Medications: None     No Known Allergies   Social History   Substance Use Topics    Smoking status: Never Smoker    Smokeless tobacco: Not on file    Alcohol use No      Family History   Problem Relation Age of Onset    Family history unknown: Yes          Objective:     Visit Vitals    /77    Pulse 63    Temp 97.6 °F (36.4 °C)    Resp 20    Ht 5' 7\" (1.702 m)    Wt 87.1 kg (192 lb)    SpO2 95%    BMI 30.07 kg/m2 Temp (24hrs), Av.6 °F (36.4 °C), Min:97.6 °F (36.4 °C), Max:97.6 °F (36.4 °C)    Oxygen Therapy  O2 Sat (%): 95 % (17 1800)  Pulse via Oximetry: 60 beats per minute (17 1800)  Physical Exam:  General:    cooperative, no distress, appears stated age. Head:   Normocephalic, without obvious abnormality, atraumatic. Nose:  Nares normal. No drainage or sinus tenderness. Lungs:   Crackles bilaterally   Heart:  RRR  Abdomen:   Soft, non-tender. Not distended. Bowel sounds normal. No masses  Extremities: No cyanosis. generlaized edema. No clubbing  Skin:     Texture, turgor normal. No rashes or lesions. Not Jaundiced  Neurologic: Lethargic, non-verbal, follows simple commands   Data Review:   Recent Results (from the past 24 hour(s))   CBC WITH AUTOMATED DIFF    Collection Time: 17  3:10 PM   Result Value Ref Range    WBC 8.1 4.3 - 11.1 K/uL    RBC 3.80 (L) 4.23 - 5.67 M/uL    HGB 11.3 (L) 13.6 - 17.2 g/dL    HCT 37.1 (L) 41.1 - 50.3 %    MCV 97.6 79.6 - 97.8 FL    MCH 29.7 26.1 - 32.9 PG    MCHC 30.5 (L) 31.4 - 35.0 g/dL    RDW 17.3 (H) 11.9 - 14.6 %    PLATELET 968 (L) 587 - 450 K/uL    MPV 11.7 10.8 - 14.1 FL    DF AUTOMATED      NEUTROPHILS 73 43 - 78 %    LYMPHOCYTES 16 13 - 44 %    MONOCYTES 10 4.0 - 12.0 %    EOSINOPHILS 1 0.5 - 7.8 %    BASOPHILS 0 0.0 - 2.0 %    IMMATURE GRANULOCYTES 0.1 0.0 - 5.0 %    ABS. NEUTROPHILS 5.9 1.7 - 8.2 K/UL    ABS. LYMPHOCYTES 1.3 0.5 - 4.6 K/UL    ABS. MONOCYTES 0.8 0.1 - 1.3 K/UL    ABS. EOSINOPHILS 0.1 0.0 - 0.8 K/UL    ABS. BASOPHILS 0.0 0.0 - 0.2 K/UL    ABS. IMM.  GRANS. 0.0 0.0 - 0.5 K/UL   METABOLIC PANEL, COMPREHENSIVE    Collection Time: 17  3:10 PM   Result Value Ref Range    Sodium 156 (H) 136 - 145 mmol/L    Potassium 3.9 3.5 - 5.1 mmol/L    Chloride 113 (H) 98 - 107 mmol/L    CO2 39 (H) 21 - 32 mmol/L    Anion gap 4 (L) 7 - 16 mmol/L    Glucose 98 65 - 100 mg/dL    BUN 33 (H) 8 - 23 MG/DL    Creatinine 0.93 0.8 - 1.5 MG/DL    GFR est AA >60 >60 ml/min/1.73m2    GFR est non-AA >60 >60 ml/min/1.73m2    Calcium 9.7 8.3 - 10.4 MG/DL    Bilirubin, total 0.5 0.2 - 1.1 MG/DL    ALT (SGPT) 257 (H) 12 - 65 U/L    AST (SGOT) 220 (H) 15 - 37 U/L    Alk. phosphatase 288 (H) 50 - 136 U/L    Protein, total 7.3 6.3 - 8.2 g/dL    Albumin 2.2 (L) 3.2 - 4.6 g/dL    Globulin 5.1 (H) 2.3 - 3.5 g/dL    A-G Ratio 0.4 (L) 1.2 - 3.5     TROPONIN I    Collection Time: 03/02/17  3:10 PM   Result Value Ref Range    Troponin-I, Qt. 0.07 (H) 0.02 - 0.05 NG/ML   BNP    Collection Time: 03/02/17  3:10 PM   Result Value Ref Range     pg/mL   EKG, 12 LEAD, INITIAL    Collection Time: 03/02/17  3:15 PM   Result Value Ref Range    Systolic BP  mmHg    Diastolic BP  mmHg    Ventricular Rate 66 BPM    Atrial Rate 394 BPM    P-R Interval  ms    QRS Duration 78 ms    Q-T Interval 390 ms    QTC Calculation (Bezet) 408 ms    Calculated P Axis  degrees    Calculated R Axis 61 degrees    Calculated T Axis -10 degrees    Diagnosis       Normal sinus rhythm  nonspecific Twave change inferiorly  When compared with ECG of 28-DEC-2016 11:17,  No significant change was found    Confirmed by CHRISTIANO HOUSER (), PREMA MERIDA ([id number]) on 3/2/2017 4:25:27 PM     POC LACTIC ACID    Collection Time: 03/02/17  4:23 PM   Result Value Ref Range    Lactic Acid (POC) 1.1 0.5 - 1.9 mmol/L   INFLUENZA A & B AG (RAPID TEST)    Collection Time: 03/02/17  4:30 PM   Result Value Ref Range    Influenza A Ag NEGATIVE  NEG      Influenza B Ag NEGATIVE  NEG         Assessment and Plan:      Active Hospital Problems    Diagnosis Date Noted    Acute respiratory distress ([facility] 03/02/2017    Hypernatremia 03/02/2017    Transaminitis 03/02/2017    Acute respiratory failure with hypoxia ([facility] 03/02/2017    Hypoalbuminemia 03/02/2017    Open wound of left heel 03/02/2017    Hypoxia 03/02/2017    PEG (percutaneous endoscopic gastrostomy) adjustment/replacement/removal ([facility] 12/02/2016    Dementia 12/02/2016    Swallowing dysfunction 11/29/2016    Aspiration of liquid 11/08/2016    HTN (hypertension) 11/11/2012    Weakness generalized 11/11/2012   Admit to medical bed   Clindamycin, follow cultures   Follow up CXR in AM  D5W  Follow sodium q4h   Serial troponin   Wound care  Surrogate decision maker: spouse, Giancarlo Brownlee   DNR per spouse   Estimated length of stay:3-4 days   Melodie Lewis., PA

## 2017-03-03 ENCOUNTER — APPOINTMENT (OUTPATIENT)
Dept: GENERAL RADIOLOGY | Age: 82
DRG: 189 | End: 2017-03-03
Payer: MEDICARE

## 2017-03-03 LAB
ANION GAP BLD CALC-SCNC: 8 MMOL/L (ref 7–16)
BACTERIA SPEC CULT: ABNORMAL
BACTERIA SPEC CULT: ABNORMAL
BASOPHILS # BLD AUTO: 0 K/UL (ref 0–0.2)
BASOPHILS # BLD: 0 % (ref 0–2)
BUN SERPL-MCNC: 32 MG/DL (ref 8–23)
CALCIUM SERPL-MCNC: 9.1 MG/DL (ref 8.3–10.4)
CHLORIDE SERPL-SCNC: 112 MMOL/L (ref 98–107)
CO2 SERPL-SCNC: 34 MMOL/L (ref 21–32)
CREAT SERPL-MCNC: 0.78 MG/DL (ref 0.8–1.5)
DIFFERENTIAL METHOD BLD: ABNORMAL
EOSINOPHIL # BLD: 0.1 K/UL (ref 0–0.8)
EOSINOPHIL NFR BLD: 1 % (ref 0.5–7.8)
ERYTHROCYTE [DISTWIDTH] IN BLOOD BY AUTOMATED COUNT: 17 % (ref 11.9–14.6)
GLUCOSE SERPL-MCNC: 94 MG/DL (ref 65–100)
HCT VFR BLD AUTO: 32 % (ref 41.1–50.3)
HGB BLD-MCNC: 9.6 G/DL (ref 13.6–17.2)
IMM GRANULOCYTES # BLD: 0 K/UL (ref 0–0.5)
IMM GRANULOCYTES NFR BLD AUTO: 0.5 % (ref 0–5)
INR PPP: 1.1 (ref 0.9–1.2)
LYMPHOCYTES # BLD AUTO: 16 % (ref 13–44)
LYMPHOCYTES # BLD: 1.2 K/UL (ref 0.5–4.6)
MCH RBC QN AUTO: 29.4 PG (ref 26.1–32.9)
MCHC RBC AUTO-ENTMCNC: 30 G/DL (ref 31.4–35)
MCV RBC AUTO: 98.2 FL (ref 79.6–97.8)
MONOCYTES # BLD: 0.9 K/UL (ref 0.1–1.3)
MONOCYTES NFR BLD AUTO: 12 % (ref 4–12)
NEUTS SEG # BLD: 5.5 K/UL (ref 1.7–8.2)
NEUTS SEG NFR BLD AUTO: 71 % (ref 43–78)
PLATELET # BLD AUTO: 118 K/UL (ref 150–450)
PMV BLD AUTO: 12.2 FL (ref 10.8–14.1)
POTASSIUM SERPL-SCNC: 3.5 MMOL/L (ref 3.5–5.1)
PROTHROMBIN TIME: 12.3 SEC (ref 9.6–12)
RBC # BLD AUTO: 3.26 M/UL (ref 4.23–5.67)
SERVICE CMNT-IMP: ABNORMAL
SODIUM SERPL-SCNC: 145 MMOL/L (ref 136–145)
SODIUM SERPL-SCNC: 153 MMOL/L (ref 136–145)
SODIUM SERPL-SCNC: 153 MMOL/L (ref 136–145)
SODIUM SERPL-SCNC: 154 MMOL/L (ref 136–145)
SODIUM SERPL-SCNC: 154 MMOL/L (ref 136–145)
SODIUM SERPL-SCNC: 157 MMOL/L (ref 136–145)
TROPONIN I SERPL-MCNC: 0.03 NG/ML (ref 0.02–0.05)
WBC # BLD AUTO: 7.8 K/UL (ref 4.3–11.1)

## 2017-03-03 PROCEDURE — 84295 ASSAY OF SERUM SODIUM: CPT

## 2017-03-03 PROCEDURE — 84484 ASSAY OF TROPONIN QUANT: CPT

## 2017-03-03 PROCEDURE — 80048 BASIC METABOLIC PNL TOTAL CA: CPT

## 2017-03-03 PROCEDURE — L4396 STATIC OR DYNAMI AFO PRE CST: HCPCS

## 2017-03-03 PROCEDURE — 74011250636 HC RX REV CODE- 250/636

## 2017-03-03 PROCEDURE — 85025 COMPLETE CBC W/AUTO DIFF WBC: CPT

## 2017-03-03 PROCEDURE — 71010 XR CHEST PORT: CPT

## 2017-03-03 PROCEDURE — 74011000250 HC RX REV CODE- 250

## 2017-03-03 PROCEDURE — 74011000258 HC RX REV CODE- 258

## 2017-03-03 PROCEDURE — 87641 MR-STAPH DNA AMP PROBE: CPT | Performed by: HOSPITALIST

## 2017-03-03 PROCEDURE — 74011000250 HC RX REV CODE- 250: Performed by: HOSPITALIST

## 2017-03-03 PROCEDURE — 65660000000 HC RM CCU STEPDOWN

## 2017-03-03 PROCEDURE — 85610 PROTHROMBIN TIME: CPT

## 2017-03-03 PROCEDURE — 77030018798 HC PMP KT ENTRL FED COVD -A

## 2017-03-03 PROCEDURE — 77030018836 HC SOL IRR NACL ICUM -A

## 2017-03-03 PROCEDURE — 36415 COLL VENOUS BLD VENIPUNCTURE: CPT

## 2017-03-03 RX ORDER — POVIDONE-IODINE 10 %
SOLUTION, NON-ORAL TOPICAL DAILY
Status: DISCONTINUED | OUTPATIENT
Start: 2017-03-03 | End: 2017-03-05 | Stop reason: ALTCHOICE

## 2017-03-03 RX ADMIN — Medication 10 ML: at 22:00

## 2017-03-03 RX ADMIN — CLINDAMYCIN PHOSPHATE 600 MG: 150 INJECTION, SOLUTION, CONCENTRATE INTRAVENOUS at 15:28

## 2017-03-03 RX ADMIN — CLINDAMYCIN PHOSPHATE 600 MG: 150 INJECTION, SOLUTION, CONCENTRATE INTRAVENOUS at 03:35

## 2017-03-03 RX ADMIN — HEPARIN SODIUM 5000 UNITS: 5000 INJECTION, SOLUTION INTRAVENOUS; SUBCUTANEOUS at 13:29

## 2017-03-03 RX ADMIN — POVIDONE-IODINE: 10 SOLUTION TOPICAL at 15:27

## 2017-03-03 RX ADMIN — DEXTROSE MONOHYDRATE 100 ML/HR: 5 INJECTION, SOLUTION INTRAVENOUS at 08:30

## 2017-03-03 RX ADMIN — Medication 5 ML: at 05:07

## 2017-03-03 RX ADMIN — HEPARIN SODIUM 5000 UNITS: 5000 INJECTION, SOLUTION INTRAVENOUS; SUBCUTANEOUS at 05:06

## 2017-03-03 RX ADMIN — HEPARIN SODIUM 5000 UNITS: 5000 INJECTION, SOLUTION INTRAVENOUS; SUBCUTANEOUS at 21:16

## 2017-03-03 RX ADMIN — CLINDAMYCIN PHOSPHATE 600 MG: 150 INJECTION, SOLUTION, CONCENTRATE INTRAVENOUS at 21:16

## 2017-03-03 RX ADMIN — CLINDAMYCIN PHOSPHATE 600 MG: 150 INJECTION, SOLUTION, CONCENTRATE INTRAVENOUS at 08:27

## 2017-03-03 RX ADMIN — Medication 5 ML: at 15:28

## 2017-03-03 NOTE — WOUND CARE
Dorsal surface right 5th toe with popped blister, well healing, ok to leave open to air. Left heel with eschar and slough, unstable pressure wound, present on admission, mild pain when palpating 4x5.5x0.3+cm recommend betadine paint and dry dressing daily. Add prevalon boots bilateral.  Will monitor.

## 2017-03-03 NOTE — PROGRESS NOTES
Pt arrived to floor. Pt oriented to room and floor. Pt is pleasantly confused and nonverbal. Pt has a G-tube in his lower left abdomen. Respirations are even and unlabored. Skin assessment completed with Santi Dutta RN. Pt has a wound on left heel. Wound consult is to be called in the morning. Speech consult to be called in AM also. Pt is unable to answer questions to complete database. No family is present at this time. Will continue to monitor.

## 2017-03-03 NOTE — PROGRESS NOTES
Problem: Nutrition Deficit  Goal: *Optimize nutritional status  Nutrition:  Reason for assessment: Consult for TF management per nutritional support protocols. Clarisa [place], [place])   Assessment:   Food/Nutrition History: Pt with severe dysphagia with PEG tube in place. Pt admitted with respiratory failure and pieces of what appeared to be food were suctioned out upon pt's arrival.  Pt is non-verbal.  PEG placed in November 2016. Abdomen soft with active bowel sounds. Date of Last BM: 3/2/17  Pertinent Medications: IVF D5 at 100ml/hr  Pertinent labs: Na 154, BUN 32  Anthropometrics:Height: 5' 7\" (170.2 cm), Weight Source: Patient stated, Weight: 87.1 kg (192 lb), Body mass index is 30.07 kg/(m^2). BMI class of overweight for age >71. RD ordered a weight due to weight source is pt stated. Macronutrient needs:  EER:  1653-9214 kcal /day (20-23 kcal/kg pt stated BW)  EPR:  67-81 grams protein/day (1-1.2 grams/kg IBW) (GFR >60)  Max CHO:  275 grams/day (55% kcal)  Fluid:  1ml/kcal  Intake/Comparative standards: Current NPO status does not meet kcal or protein needs     Nutrition Diagnosis: Difficulty swallowing r/t severe dysphagia as evidenced by pt with PEG and TF dependent for nutrition. Intervention:  Meals and snacks: NPO  EN:Start TF of Osmolite 1.2 at 20 ml/hr and increase by 10 ml/hr q 4 hrs as tolerated to goal rate of 60ml/hr with 40ml water q 1hr to provide 1728 kcal/day (99% of needs), 80 grams protein/day (100% of needs), 227 grams CHO/day (does not exceed max CHO),  and ~ 2140ml free water/day (>100% of needs). Nutrition Supplement Therapy: Electrolyte replacement per nutritional support protocols are active on MAR. IV: Adjust IVF ml for ml of TF rate increase. (TF+ water flush)+ JWR=319od/hr. Discontinue IVF when TF reaches goal rate. Cristina Haines, Le[address], [address], -2011

## 2017-03-03 NOTE — PROGRESS NOTES
Patient tube feeding started at 20 ml/hr of feeding and 40 ml/hr of water per orders. Rooke boot also applied to left foot per wound orders. Patient resting well.

## 2017-03-03 NOTE — PROGRESS NOTES
Speech Pathology Note:    Screen received via Best Practice Alert from Nursing Assessment. Screen completed and Chart reviewed. Nursing Functional assessment revealed history of dysphagia - most recent recommendations at this facility dated 11/30/2016 for puree/honey thick liquids via tsp and supplemental tube feeding via PEG. Patient would likely benefit from speech therapy assessment when stabilized to determine least restrictive and safest diet recommendation. Thank you,    Liudmila Zepeda MS, CCC-SLP

## 2017-03-03 NOTE — PROGRESS NOTES
Pt resting in bed. No visual distress. Call light in reach. Will continue to monitor through open door.

## 2017-03-03 NOTE — PROGRESS NOTES
Hospitalist Progress Note    3/3/2017  Admit Date: 3/2/2017  3:14 PM   NAME: Sebas Hastings   :  3/31/1921   MRN:  [id number]   Attending: Zohra Quintero MD  PCP:  [facility] On File [facility]    SUBJECTIVE:   Patient Sebas Hastings is a 80 y.o. male that presented from nursing facility with report of respiratory distress and congestion requiring suctioning. He is noted to have hypoxia with SpO2 of 88% on RA. Patient is non-verbal at baseline per wife and requires TF support . 120cc suctioned from oropharynx on admit. 3-3 - pt with eyes half open, no tracking, does not follow commands. \"wet\" sounding respirations from oropharynx.          Review of Systems negative with exception of pertinent positives noted above  PHYSICAL EXAM     Visit Vitals    /72    Pulse (!) 55    Temp 98.1 °F (36.7 °C)    Resp 18    Ht 5' 7\" (1.702 m)    Wt 87.1 kg (192 lb)    SpO2 100%    BMI 30.07 kg/m2      Temp (24hrs), Av °F (36.7 °C), Min:97.7 °F (36.5 °C), Max:98.2 °F (36.8 °C)    Oxygen Therapy  O2 Sat (%): 100 % (17 1500)  Pulse via Oximetry: 60 beats per minute (17 1800)    Intake/Output Summary (Last 24 hours) at 17 1656  Last data filed at 17 1608   Gross per 24 hour   Intake              240 ml   Output                0 ml   Net              240 ml      General: No acute distress    Lungs:  Diminished bilaterally  Heart:  Regular rate and rhythm,  No murmur, rub, or gallop  Abdomen: Soft, Non distended, Non tender, Positive bowel sounds  Extremities: No cyanosis, clubbing or edema  Neurologic:  No focal deficits    ASSESSMENT      Active Hospital Problems    Diagnosis Date Noted    Hypernatremia 2017    Transaminitis 2017    Acute respiratory failure with hypoxia (HCC) 2017    Hypoalbuminemia 2017    Open wound of left heel 2017    Hypoxia 2017    PEG (percutaneous endoscopic gastrostomy) adjustment/replacement/removal (HCC) 2016    Dementia 12/02/2016    Swallowing dysfunction 11/29/2016    Aspiration of liquid 11/08/2016    HTN (hypertension) 11/11/2012    Weakness generalized 11/11/2012     A/P  - Acute on chronic encephalopathy - may have had acute event given decreased LOC  (although baseline seems dismal per d/w wife). Defer imaging at this time as unlikely to . Monitor for any improvement with correction of electrolytes  - Hypernatremia - d5 infusion, serial bmps  - Aspiration Pneumonia - continue clindamycin given degree of secretions/food sucked out PTA. - left heel wound - wound care following, not infected and present on arrival    Spoke with patient's wife Sabino Balderas ([phone]). She admits that patient would never \"want to live this way\". Agrees to have meeting with her daughters to discuss plan of care. Did inform Siobhan Bangura that hospice is very appropriate at current time.      DVT Prophylaxis: hep sq    Signed By: Jose Krishnan DO     March 3, 2017

## 2017-03-03 NOTE — PROGRESS NOTES
Patient appears comfortable and in no pain. Patient is turned q 2hrs. Boot on left foot intact. Tube feeding running. Door open for visual.  Patient temp. Unable to be obtained. MD notified. Will continue to monitor.

## 2017-03-03 NOTE — PROGRESS NOTES
Patient in bed resting with no visual discomfort noted. Patient is nonverbal and lethargic. Patient does open eyes which are red at times with stimuli. IV intact and patent with no s/s of infection noted. Respirations even and unlabored with heart rate regular. Patient on total bedrest r/t very limited movement. Bed in low locked position with call light within reach. Patient door open for visual.  Will continue to monitor.

## 2017-03-04 ENCOUNTER — APPOINTMENT (OUTPATIENT)
Dept: GENERAL RADIOLOGY | Age: 82
DRG: 189 | End: 2017-03-04
Attending: INTERNAL MEDICINE
Payer: MEDICARE

## 2017-03-04 LAB
ANION GAP BLD CALC-SCNC: 3 MMOL/L (ref 7–16)
BUN SERPL-MCNC: 27 MG/DL (ref 8–23)
CALCIUM SERPL-MCNC: 9.1 MG/DL (ref 8.3–10.4)
CHLORIDE SERPL-SCNC: 106 MMOL/L (ref 98–107)
CO2 SERPL-SCNC: 38 MMOL/L (ref 21–32)
CREAT SERPL-MCNC: 0.66 MG/DL (ref 0.8–1.5)
GLUCOSE BLD STRIP.AUTO-MCNC: 139 MG/DL (ref 65–100)
GLUCOSE SERPL-MCNC: 111 MG/DL (ref 65–100)
POTASSIUM SERPL-SCNC: 3.4 MMOL/L (ref 3.5–5.1)
SODIUM SERPL-SCNC: 146 MMOL/L (ref 136–145)
SODIUM SERPL-SCNC: 147 MMOL/L (ref 136–145)
SODIUM SERPL-SCNC: 147 MMOL/L (ref 136–145)
SODIUM SERPL-SCNC: 148 MMOL/L (ref 136–145)
SODIUM SERPL-SCNC: 149 MMOL/L (ref 136–145)

## 2017-03-04 PROCEDURE — 74011000258 HC RX REV CODE- 258

## 2017-03-04 PROCEDURE — 77030020250 HC SOL INJ D 5% LFCR 1000ML BG LF

## 2017-03-04 PROCEDURE — 74011000250 HC RX REV CODE- 250: Performed by: HOSPITALIST

## 2017-03-04 PROCEDURE — 77030034849

## 2017-03-04 PROCEDURE — 74000 XR ABD (KUB): CPT

## 2017-03-04 PROCEDURE — 74011250636 HC RX REV CODE- 250/636

## 2017-03-04 PROCEDURE — 77030018846 HC SOL IRR STRL H20 ICUM -A

## 2017-03-04 PROCEDURE — 80048 BASIC METABOLIC PNL TOTAL CA: CPT

## 2017-03-04 PROCEDURE — 65660000000 HC RM CCU STEPDOWN

## 2017-03-04 PROCEDURE — 74011250637 HC RX REV CODE- 250/637: Performed by: HOSPITALIST

## 2017-03-04 PROCEDURE — 65270000029 HC RM PRIVATE

## 2017-03-04 PROCEDURE — 82962 GLUCOSE BLOOD TEST: CPT

## 2017-03-04 PROCEDURE — 74011250637 HC RX REV CODE- 250/637: Performed by: INTERNAL MEDICINE

## 2017-03-04 PROCEDURE — 71010 XR CHEST PORT: CPT

## 2017-03-04 PROCEDURE — 84295 ASSAY OF SERUM SODIUM: CPT

## 2017-03-04 PROCEDURE — 74011000250 HC RX REV CODE- 250

## 2017-03-04 PROCEDURE — 36415 COLL VENOUS BLD VENIPUNCTURE: CPT

## 2017-03-04 RX ORDER — ACETAMINOPHEN 500 MG
1000 TABLET ORAL
Status: DISCONTINUED | OUTPATIENT
Start: 2017-03-04 | End: 2017-03-06 | Stop reason: HOSPADM

## 2017-03-04 RX ORDER — DOCUSATE SODIUM 100 MG/1
100 CAPSULE, LIQUID FILLED ORAL 2 TIMES DAILY
Status: DISCONTINUED | OUTPATIENT
Start: 2017-03-04 | End: 2017-03-05 | Stop reason: ALTCHOICE

## 2017-03-04 RX ORDER — TAMSULOSIN HYDROCHLORIDE 0.4 MG/1
0.4 CAPSULE ORAL DAILY
Status: DISCONTINUED | OUTPATIENT
Start: 2017-03-05 | End: 2017-03-05 | Stop reason: ALTCHOICE

## 2017-03-04 RX ORDER — TRAZODONE HYDROCHLORIDE 50 MG/1
25 TABLET ORAL
Status: DISCONTINUED | OUTPATIENT
Start: 2017-03-04 | End: 2017-03-05 | Stop reason: ALTCHOICE

## 2017-03-04 RX ORDER — FINASTERIDE 5 MG/1
5 TABLET, FILM COATED ORAL DAILY
Status: DISCONTINUED | OUTPATIENT
Start: 2017-03-05 | End: 2017-03-05 | Stop reason: ALTCHOICE

## 2017-03-04 RX ORDER — FACIAL-BODY WIPES
10 EACH TOPICAL DAILY PRN
Status: DISCONTINUED | OUTPATIENT
Start: 2017-03-04 | End: 2017-03-05 | Stop reason: ALTCHOICE

## 2017-03-04 RX ORDER — LOSARTAN POTASSIUM 50 MG/1
50 TABLET ORAL DAILY
Status: DISCONTINUED | OUTPATIENT
Start: 2017-03-05 | End: 2017-03-05 | Stop reason: ALTCHOICE

## 2017-03-04 RX ORDER — PANTOPRAZOLE SODIUM 40 MG/1
40 TABLET, DELAYED RELEASE ORAL
Status: DISCONTINUED | OUTPATIENT
Start: 2017-03-05 | End: 2017-03-05 | Stop reason: ALTCHOICE

## 2017-03-04 RX ORDER — GUAIFENESIN 100 MG/5ML
81 LIQUID (ML) ORAL DAILY
Status: DISCONTINUED | OUTPATIENT
Start: 2017-03-05 | End: 2017-03-05 | Stop reason: ALTCHOICE

## 2017-03-04 RX ORDER — CARVEDILOL 12.5 MG/1
12.5 TABLET ORAL 2 TIMES DAILY WITH MEALS
Status: DISCONTINUED | OUTPATIENT
Start: 2017-03-04 | End: 2017-03-05 | Stop reason: ALTCHOICE

## 2017-03-04 RX ORDER — MUPIROCIN 20 MG/G
OINTMENT TOPICAL 2 TIMES DAILY
Status: DISCONTINUED | OUTPATIENT
Start: 2017-03-04 | End: 2017-03-05 | Stop reason: ALTCHOICE

## 2017-03-04 RX ORDER — TROSPIUM CHLORIDE 20 MG/1
20 TABLET, FILM COATED ORAL
Status: DISCONTINUED | OUTPATIENT
Start: 2017-03-05 | End: 2017-03-05 | Stop reason: ALTCHOICE

## 2017-03-04 RX ORDER — CLOPIDOGREL BISULFATE 75 MG/1
75 TABLET ORAL DAILY
Status: DISCONTINUED | OUTPATIENT
Start: 2017-03-05 | End: 2017-03-05 | Stop reason: ALTCHOICE

## 2017-03-04 RX ADMIN — Medication 10 ML: at 22:30

## 2017-03-04 RX ADMIN — POVIDONE-IODINE: 10 SOLUTION TOPICAL at 08:44

## 2017-03-04 RX ADMIN — TRAZODONE HYDROCHLORIDE 25 MG: 50 TABLET ORAL at 22:27

## 2017-03-04 RX ADMIN — HEPARIN SODIUM 5000 UNITS: 5000 INJECTION, SOLUTION INTRAVENOUS; SUBCUTANEOUS at 05:09

## 2017-03-04 RX ADMIN — CARVEDILOL 12.5 MG: 12.5 TABLET, FILM COATED ORAL at 22:27

## 2017-03-04 RX ADMIN — LEVETIRACETAM 500 MG: 100 SOLUTION ORAL at 22:27

## 2017-03-04 RX ADMIN — HEPARIN SODIUM 5000 UNITS: 5000 INJECTION, SOLUTION INTRAVENOUS; SUBCUTANEOUS at 13:24

## 2017-03-04 RX ADMIN — CLINDAMYCIN PHOSPHATE 600 MG: 150 INJECTION, SOLUTION, CONCENTRATE INTRAVENOUS at 08:44

## 2017-03-04 RX ADMIN — MUPIROCIN: 20 OINTMENT TOPICAL at 08:51

## 2017-03-04 RX ADMIN — CLINDAMYCIN PHOSPHATE 600 MG: 150 INJECTION, SOLUTION, CONCENTRATE INTRAVENOUS at 03:44

## 2017-03-04 RX ADMIN — Medication 10 ML: at 17:07

## 2017-03-04 RX ADMIN — CLINDAMYCIN PHOSPHATE 600 MG: 150 INJECTION, SOLUTION, CONCENTRATE INTRAVENOUS at 13:24

## 2017-03-04 RX ADMIN — HEPARIN SODIUM 5000 UNITS: 5000 INJECTION, SOLUTION INTRAVENOUS; SUBCUTANEOUS at 22:26

## 2017-03-04 RX ADMIN — MUPIROCIN: 20 OINTMENT TOPICAL at 17:07

## 2017-03-04 NOTE — PROGRESS NOTES
Shift assessment completed. Pt. opens eyes when spoken to. Able to tell me name and that he is in the hospital this am. Salma Leiva at bedside. Small amt. Of tan thick sputum suctioned via mouth. Osmolite infusing @ 40ml/hr. Increased per order. Prevalon boot in place. SCDs in place. Door open. Call light in reach.

## 2017-03-04 NOTE — PROGRESS NOTES
Pt. resting in bed quietly. Eyes closed. Opens eyes to voice. Not communicating this afternoon. Supriya Xavier at bedside for excess secretions. Osmolite 1.2 infusing @ 60ml/hr water flush @ 40ml/hr. Will monitor. Door open.

## 2017-03-04 NOTE — PROGRESS NOTES
Rectal temp is 94.1. Placed several blankets on pt. and turned heat up in room. Extremities warm to touch. Will monitor.

## 2017-03-04 NOTE — PROGRESS NOTES
Dr. Emily Brink returned phone call. Made aware of temp of 94.1 rectal. Verbal order to order david moreno if temp doesn't come up in 30min. Will monitor.

## 2017-03-04 NOTE — PROGRESS NOTES
Hospitalist Progress Note    3/4/2017  Admit Date: 3/2/2017  3:14 PM   NAME: Angela Borrego   :  3/31/1921   MRN:  [id number]   Attending: Hong King MD  PCP:  Not On File [facility]    SUBJECTIVE:   Patient Angela Borrego is a 80 y.o. male that presented from nursing facility with report of respiratory distress and congestion requiring suctioning. He is noted to have hypoxia with SpO2 of 88% on RA. Patient is non-verbal at baseline per wife and requires TF support . 120cc suctioned from oropharynx on admit. Started empirically on clindamycin. 3-3 - pt with eyes half open, no tracking, does not follow commands. \"wet\" sounding respirations from oropharynx. 3-4 - opens eyes to command but that is only command followed. Nonsensical speech.  Seemingly back to baseline per d/w wife yeasterday        Review of Systems negative with exception of pertinent positives noted above  PHYSICAL EXAM     Visit Vitals    /61 (BP 1 Location: Left arm, BP Patient Position: Lying right side)    Pulse 73    Temp 98.3 °F (36.8 °C)    Resp 8    Ht 5' 7\" (1.702 m)    Wt 87.1 kg (192 lb)    SpO2 96%    BMI 30.07 kg/m2      Temp (24hrs), Av.3 °F (35.2 °C), Min:94.1 °F (34.5 °C), Max:98.3 °F (36.8 °C)    Oxygen Therapy  O2 Sat (%): 96 % (17 1651)  Pulse via Oximetry: 60 beats per minute (17 1800)    Intake/Output Summary (Last 24 hours) at 17 1732  Last data filed at 17 1442   Gross per 24 hour   Intake               40 ml   Output              500 ml   Net             -460 ml      General: No acute distress    Lungs:  Diminished bilaterally  Heart:  Regular rate and rhythm,  No murmur, rub, or gallop  Abdomen: Soft, Non distended, Non tender, Positive bowel sounds  Extremities: No cyanosis, clubbing or edema  Neurologic:  No focal deficits    ASSESSMENT      Active Hospital Problems    Diagnosis Date Noted    Hypernatremia 2017    Transaminitis 2017    Acute respiratory failure with hypoxia ([facility] 03/02/2017    Hypoalbuminemia 03/02/2017    Open wound of left heel 03/02/2017    Hypoxia 03/02/2017    PEG (percutaneous endoscopic gastrostomy) adjustment/replacement/removal ([facility] 12/02/2016    Dementia 12/02/2016    Swallowing dysfunction 11/29/2016    Aspiration of liquid 11/08/2016    HTN (hypertension) 11/11/2012    Weakness generalized 11/11/2012     A/P  - Acute on chronic encephalopathy - slightly improved - suspect now at baseline  - Hypernatremia - will increase free water boluses - 60/hr  - Aspiration Pneumonia - ruled out, procal low and CXR unremarkable. Stop clindamycin  - left heel wound - wound care following, not infected and present on arrival  - urinary retention - patel placed. Restart home BPH meds    Dispo - consider dc back to SNF tomorrow.      DVT Prophylaxis: hep sq    Signed By: Nicol Goddard DO     March 4, 2017

## 2017-03-04 NOTE — PROGRESS NOTES
Rectal temp is 98.3. Dr. Evelyn Castanon made aware. Will take kali moreno off for now. Will monitor.

## 2017-03-05 ENCOUNTER — APPOINTMENT (OUTPATIENT)
Dept: CT IMAGING | Age: 82
DRG: 189 | End: 2017-03-05
Attending: INTERNAL MEDICINE
Payer: MEDICARE

## 2017-03-05 LAB
ANION GAP BLD CALC-SCNC: 6 MMOL/L (ref 7–16)
BUN SERPL-MCNC: 30 MG/DL (ref 8–23)
CALCIUM SERPL-MCNC: 8.6 MG/DL (ref 8.3–10.4)
CHLORIDE SERPL-SCNC: 106 MMOL/L (ref 98–107)
CO2 SERPL-SCNC: 36 MMOL/L (ref 21–32)
CREAT SERPL-MCNC: 0.82 MG/DL (ref 0.8–1.5)
GLUCOSE SERPL-MCNC: 98 MG/DL (ref 65–100)
POTASSIUM SERPL-SCNC: 3.5 MMOL/L (ref 3.5–5.1)
SODIUM SERPL-SCNC: 147 MMOL/L (ref 136–145)
SODIUM SERPL-SCNC: 148 MMOL/L (ref 136–145)
SODIUM SERPL-SCNC: 148 MMOL/L (ref 136–145)

## 2017-03-05 PROCEDURE — 84295 ASSAY OF SERUM SODIUM: CPT

## 2017-03-05 PROCEDURE — 65270000029 HC RM PRIVATE

## 2017-03-05 PROCEDURE — 74011250637 HC RX REV CODE- 250/637: Performed by: INTERNAL MEDICINE

## 2017-03-05 PROCEDURE — 80048 BASIC METABOLIC PNL TOTAL CA: CPT | Performed by: INTERNAL MEDICINE

## 2017-03-05 PROCEDURE — 74011250636 HC RX REV CODE- 250/636

## 2017-03-05 PROCEDURE — 74011250636 HC RX REV CODE- 250/636: Performed by: INTERNAL MEDICINE

## 2017-03-05 PROCEDURE — 36415 COLL VENOUS BLD VENIPUNCTURE: CPT

## 2017-03-05 PROCEDURE — 74011000250 HC RX REV CODE- 250: Performed by: INTERNAL MEDICINE

## 2017-03-05 PROCEDURE — 70450 CT HEAD/BRAIN W/O DYE: CPT

## 2017-03-05 PROCEDURE — 65660000000 HC RM CCU STEPDOWN

## 2017-03-05 RX ORDER — MORPHINE SULFATE 2 MG/ML
2 INJECTION, SOLUTION INTRAMUSCULAR; INTRAVENOUS
Status: DISCONTINUED | OUTPATIENT
Start: 2017-03-05 | End: 2017-03-06 | Stop reason: HOSPADM

## 2017-03-05 RX ORDER — GLYCOPYRROLATE 0.2 MG/ML
0.1 INJECTION INTRAMUSCULAR; INTRAVENOUS 3 TIMES DAILY
Status: DISCONTINUED | OUTPATIENT
Start: 2017-03-05 | End: 2017-03-06 | Stop reason: HOSPADM

## 2017-03-05 RX ORDER — LORAZEPAM 2 MG/ML
2 INJECTION INTRAMUSCULAR
Status: DISCONTINUED | OUTPATIENT
Start: 2017-03-05 | End: 2017-03-06 | Stop reason: HOSPADM

## 2017-03-05 RX ADMIN — CARVEDILOL 12.5 MG: 12.5 TABLET, FILM COATED ORAL at 09:05

## 2017-03-05 RX ADMIN — Medication 10 ML: at 05:55

## 2017-03-05 RX ADMIN — LOSARTAN POTASSIUM 50 MG: 50 TABLET ORAL at 09:06

## 2017-03-05 RX ADMIN — PANTOPRAZOLE SODIUM 40 MG: 40 TABLET, DELAYED RELEASE ORAL at 05:57

## 2017-03-05 RX ADMIN — MUPIROCIN: 20 OINTMENT TOPICAL at 09:00

## 2017-03-05 RX ADMIN — GLYCOPYRROLATE 0.1 MG: 0.2 INJECTION, SOLUTION INTRAMUSCULAR; INTRAVENOUS at 21:51

## 2017-03-05 RX ADMIN — LEVETIRACETAM 500 MG: 100 SOLUTION ORAL at 09:09

## 2017-03-05 RX ADMIN — Medication 2 MG: at 17:51

## 2017-03-05 RX ADMIN — GLYCOPYRROLATE 0.1 MG: 0.2 INJECTION, SOLUTION INTRAMUSCULAR; INTRAVENOUS at 16:29

## 2017-03-05 RX ADMIN — POVIDONE-IODINE: 10 SOLUTION TOPICAL at 09:00

## 2017-03-05 RX ADMIN — HEPARIN SODIUM 5000 UNITS: 5000 INJECTION, SOLUTION INTRAVENOUS; SUBCUTANEOUS at 05:55

## 2017-03-05 RX ADMIN — ASPIRIN 81 MG 81 MG: 81 TABLET ORAL at 09:06

## 2017-03-05 RX ADMIN — Medication 2 MG: at 13:46

## 2017-03-05 RX ADMIN — CLOPIDOGREL BISULFATE 75 MG: 75 TABLET, FILM COATED ORAL at 09:06

## 2017-03-05 RX ADMIN — LORAZEPAM 2 MG: 2 INJECTION INTRAMUSCULAR; INTRAVENOUS at 14:58

## 2017-03-05 RX ADMIN — TAMSULOSIN HYDROCHLORIDE 0.4 MG: 0.4 CAPSULE ORAL at 09:05

## 2017-03-05 RX ADMIN — FINASTERIDE 5 MG: 5 TABLET, FILM COATED ORAL at 09:06

## 2017-03-05 NOTE — PROGRESS NOTES
Patient in bed resting with no complaints at this time. Patient opens eyes to stimuli with no verbal response but no distress noted. IV intact and patent with no s/s of infection noted. Respirations even and unlabored with heart rate regular. Patient unable to ambulate independently; total bedrest r/t limited movement. Boot on L foot r/t wound. Bed in low locked position with call light within reach. Patient instructed to call if assistance is needed. Will continue to monitor.

## 2017-03-05 NOTE — PROGRESS NOTES
Ativan given for restlessness. Patient appears uncomfortable even with repositioning. Will continue to monitor.

## 2017-03-05 NOTE — PROGRESS NOTES
Hospitalist Progress Note    3/5/2017  Admit Date: 3/2/2017  3:14 PM   NAME: Mathew Chahal   :  3/31/1921   MRN:  [id number]   Attending: Janie Epps MD  [facility]  [facility]    SUBJECTIVE:   Patient Mathew Chahal is a 80 y.o. male that presented from nursing facility with report of respiratory distress and congestion requiring suctioning. He is noted to have hypoxia with SpO2 of 88% on RA. Patient is non-verbal at baseline per wife and requires TF support . 120cc suctioned from oropharynx on admit. Started empirically on clindamycin. Patient continued to be minimally responsive, no meaningful speech and not following commands. Overall progressive decline during hospital stay. CXR remained clear although suspect chronic microaspiration. CT head non acute. 3-5 spoke with family at bedside. Informed them of very poor prognosis and dismal quality of life (prior and current). Offered cessation of current therapies and initiation of comfort care which they are agreeable to.      Review of Systems negative with exception of pertinent positives noted above  PHYSICAL EXAM     Visit Vitals    /51 (BP 1 Location: Left arm, BP Patient Position: Lying right side)    Pulse 71    Temp 99.9 °F (37.7 °C)    Resp 10    Ht 5' 7\" (1.702 m)    Wt 87.1 kg (192 lb)    SpO2 95%    BMI 30.07 kg/m2      Temp (24hrs), Av.2 °F (36.8 °C), Min:94.6 °F (34.8 °C), Max:100 °F (37.8 °C)    Oxygen Therapy  O2 Sat (%): 95 % (17 1115)  Pulse via Oximetry: 60 beats per minute (17 1800)    Intake/Output Summary (Last 24 hours) at 17 1243  Last data filed at 17 0846   Gross per 24 hour   Intake               80 ml   Output             1050 ml   Net             -970 ml      General: No acute distress    Lungs:  Diminished bilaterally  Heart:  Regular rate and rhythm,  No murmur, rub, or gallop  Abdomen: Soft, Non distended, Non tender, Positive bowel sounds  Extremities: No cyanosis, clubbing or edema  Neurologic:  No focal deficits    ASSESSMENT      Active Hospital Problems    Diagnosis Date Noted    Hypernatremia 03/02/2017    Transaminitis 03/02/2017    Acute respiratory failure with hypoxia (HCC) 03/02/2017    Hypoalbuminemia 03/02/2017    Open wound of left heel 03/02/2017    Hypoxia 03/02/2017    PEG (percutaneous endoscopic gastrostomy) adjustment/replacement/removal ([facility] 12/02/2016    Dementia 12/02/2016    Swallowing dysfunction 11/29/2016    Aspiration of liquid 11/08/2016    HTN (hypertension) 11/11/2012    Weakness generalized 11/11/2012     A/P  - Chronic microaspiration d/t dysphagia  - Failure to thrive  - Severe dementia  - Electrolyte derangements    Family agreeable to comfort care. PRN orders placed.          Signed By: Marie Rojas DO     March 5, 2017

## 2017-03-05 NOTE — PROGRESS NOTES
Patient appeared somewhat uncomfortable. Morphine given per order. Patient comfort measures at this time. Feeding stopped. Family was at bedside but is gone now. Patient has some bloody discharge from tip of penis and on catheter noted. Will continue to observe this. Door open for visual of patient. Will continue to monitor.

## 2017-03-05 NOTE — PROGRESS NOTES
Pt resting comfortably in bed. Respirations are even and unlabored. No signs or symptoms of distress are noted. No SOB or pain is reported at this time. Will continue to monitor.

## 2017-03-05 NOTE — PROGRESS NOTES
End of life/ comfort care:  Patient resting comfortably. Angelina wife of 76 years at bedside along with her family. Patient is long time . Wife said God has prepared her for this day. Thankful for patient's life and their marriage. Wife shared their story of how they met when he was in the [employer]. She has wonderful memories with no regrets. Prayer at bedside. Addressing the families spiritual needs is valuable in the care of this family at the end of life.   Signed by chaplain Hortensia

## 2017-03-06 ENCOUNTER — HOSPICE ADMISSION (OUTPATIENT)
Dept: HOSPICE | Facility: HOSPICE | Age: 82
End: 2017-03-06

## 2017-03-06 VITALS
DIASTOLIC BLOOD PRESSURE: 69 MMHG | OXYGEN SATURATION: 100 % | BODY MASS INDEX: 30.13 KG/M2 | SYSTOLIC BLOOD PRESSURE: 124 MMHG | WEIGHT: 192 LBS | HEIGHT: 67 IN | TEMPERATURE: 97.4 F | HEART RATE: 67 BPM | RESPIRATION RATE: 20 BRPM

## 2017-03-06 LAB
BACTERIA SPEC CULT: ABNORMAL
GRAM STN SPEC: ABNORMAL
SERVICE CMNT-IMP: ABNORMAL

## 2017-03-06 PROCEDURE — 74011250636 HC RX REV CODE- 250/636: Performed by: INTERNAL MEDICINE

## 2017-03-06 PROCEDURE — 74011000250 HC RX REV CODE- 250: Performed by: INTERNAL MEDICINE

## 2017-03-06 RX ADMIN — Medication 2 MG: at 10:56

## 2017-03-06 RX ADMIN — Medication 2 MG: at 06:15

## 2017-03-06 RX ADMIN — Medication 2 MG: at 08:39

## 2017-03-06 RX ADMIN — GLYCOPYRROLATE 0.1 MG: 0.2 INJECTION, SOLUTION INTRAMUSCULAR; INTRAVENOUS at 08:39

## 2017-03-06 NOTE — PROGRESS NOTES
Problem: Nutrition Deficit  Goal: *Optimize nutritional status  Nutrition F/U:  TF management  Pt is now comfort measures only. TF has been stopped and pt remains NPO. Nutrition to respectfully sign off at this time. Please re-consult if nutrition is needed. Ulysses Chum Nash Catalan, [address], [address] [place] [address], [phone]

## 2017-03-06 NOTE — PROGRESS NOTES
Lethargic with unlabored respirations. Nasal cannula in place. Head of bed elevated. No sign or symptom of discomfort noted. Shift assessment complete see flow sheets.

## 2017-03-06 NOTE — PROGRESS NOTES
Follow-up visit requested by  staff to offer end-of-life support to Mr. Paris. Mr. Malka Cruz appeared to be sleeping upon my arrival with no visitors present. I did not disturb patient. Chaplains remain available for support as needed.      Cheryle Lang 68  Board Certified

## 2017-03-06 NOTE — PROGRESS NOTES
Patient is discharging back to [facility] this afternoon. Discharge delayed until 17:00 per family request as they would like to be there when the patient arrives. Transportation arranged through [facility]. Case Management will remain available to assist as needed.     Care Management Interventions  Transition of Care Consult (CM Consult): Discharge Planning, SNF  Discharge Durable Medical Equipment: No  Physical Therapy Consult: No  Occupational Therapy Consult: No  Speech Therapy Consult: No  Current Support Network: Nursing Facility  Confirm Follow Up Transport: Family  Plan discussed with Pt/Family/Caregiver: Yes  Freedom of Choice Offered: Yes  Discharge Location  Discharge Placement: Skilled nursing facility

## 2017-03-06 NOTE — PROGRESS NOTES
Patient in bed resting but appears uncomfortable, will medicate. Patient is unresponsive but no distress noted. IV intact and patent with no s/s of infection noted. Respirations even and unlabored with heart rate regular. Patient unable to ambulate independently without assistance; total bedrest r/t immobile at this time. Patient is comfort care at this time. Bed in low locked position with call light within reach. Patient instructed to call if assistance is needed. Will continue to monitor.   Door open for visual.

## 2017-03-06 NOTE — PROGRESS NOTES
Unresponsive to touch or verbal stimuli. Continuing to suction orally and position for comfort. Head of bed elevated. Respirations unlabored. No sign or symptom of pain.

## 2017-03-06 NOTE — PROGRESS NOTES
Patient resting with unlabored respiration. Nasal cannula in place. Head of bed elevated. So sign or symptom of discomfort noted. Frequent oral suctioning with thick yellow mucous obtained. Frequent oral care given. No reaction to care. Will monitor.

## 2017-03-06 NOTE — PROGRESS NOTES
Report called to JUNO HOOVER at [facility] with time given for questions and answers. Name and number also left if questions should arise. [facility] transport here to pick patient up to transport patient to facility. Family is aware.

## 2017-03-06 NOTE — DISCHARGE SUMMARY
Hospitalist Discharge Summary     Patient ID:  Gloria Christensen  [id number]  89 y.o.  3/31/1921  Admit date: 3/2/2017  3:14 PM  Discharge date and time: 3/6/2017  Attending: Evan Amaral MD  PCP:  Not On File [facility]  Treatment Team: Attending Provider: Evan Amaral MD; Utilization Review: Kenton Leyden, RN    Principal Diagnosis Acute respiratory failure with hypoxia [facility])   Principal Problem:    Acute respiratory failure with hypoxia ([facility] [address] (3/2/2017)    Active Problems:    Weakness generalized (11/11/2012)      HTN (hypertension) (11/11/2012)      Aspiration of liquid (11/8/2016)      Swallowing dysfunction (11/29/2016)      PEG (percutaneous endoscopic gastrostomy) adjustment/replacement/removal ([facility] [address] (12/2/2016)      Dementia (12/2/2016)      Hypernatremia (3/2/2017)      Transaminitis (3/2/2017)      Hypoalbuminemia (3/2/2017)      Open wound of left heel (3/2/2017)      Hypoxia (3/2/2017)             Hospital Course:  Please refer to the admission H&P for details of presentation. In summary, Gloria Christensen is a 80 y.o. male that presented from nursing facility with report of respiratory distress and congestion requiring suctioning. He is noted to have hypoxia with SpO2 of 88% on RA. Patient is non-verbal at baseline per wife and requires TF support. 120cc suctioned from oropharynx on admit. Started empirically on clindamycin. Patient continued to be minimally responsive, no meaningful speech and not following commands. Overall progressive decline during hospital stay. CXR remained clear although suspect chronic microaspiration. CT head non acute. Family agreeable to \"keep him comfortable\" and comfort care orders initiated on 3-5. He remained HD stable.  Family does not want to consider [facility] scenario and requests transfer back to [facility] with resumption of hospice on arrival.  There was a gentleman from \"diagnostics\" apparently employed by the family to DNA test the patient reportedly for inheritance purposes. This was done to the patient prior to treatment team being notified.      Significant Diagnostic Studies:   Final result (Exam End: 3/5/2017 11:59 AM) Open        Study Result      Examination: CT scan of the brain without contrast.     History: Encephalopathy, 89 years Male Acute on chronic encephalopathy -  slightly improved - suspect now at baseline     Technique: 5 mm axial imaging of the brain from the posterior fossa to the  vertex. Radiation dose reduction techniques were used for this study: Our CT  scanners use one or all of the following: Automated exposure control, adjustment  of the mA and/or kVp according to patient's size, iterative reconstruction.     Comparison: CT brain November 03, 2016     Findings: The brain parenchyma appears within normal limits for age. Probable  mild microvascular disease unchanged. The ventricles, sulci are  age-appropriate. No intracranial hemorrhage or extra-axial collection is  identified. No evidence of acute infarct. No mass effect or midline shift is  present. Basal cisterns are intact. New moderate mucosal reaction of the  bilateral ethmoid sinuses. The visualized paranasal sinuses and mastoid air  cells are otherwise clear. The orbits, bones, and soft tissues are normal in  appearance.     IMPRESSION  IMPRESSION: Normal unenhanced CT of the brain for age. No acute intracranial  abnormality. Labs: Results:       Chemistry Recent Labs      03/05/17   1435  03/05/17   0635  03/05/17   0008   03/04/17   0740   GLU   --   98   --    --   111*   NA  147*  148*  148*   < >  147*   K   --   3.5   --    --   3.4*   CL   --   106   --    --   106   CO2   --   36*   --    --   38*   BUN   --   30*   --    --   27*   CREA   --   0.82   --    --   0.66*   CA   --   8.6   --    --   9.1   AGAP   --   6*   --    --   3*    < > = values in this interval not displayed.       CBC w/Diff No results for input(s): WBC, RBC, HGB, HCT, PLT, GRANS, LYMPH, EOS, HGBEXT, HCTEXT, PLTEXT in the last 72 hours. Cardiac Enzymes No results for input(s): CPK, CKND1, VANESSA in the last 72 hours. No lab exists for component: CKRMB, TROIP   Coagulation No results for input(s): PTP, INR, APTT in the last 72 hours. No lab exists for component: INREXT    Lipid Panel No results found for: CHOL, CHOLPOCT, CHOLX, CHLST, CHOLV, [id number], HDL, LDL, NLDLCT, DLDL, LDLC, DLDLP, 466262, VLDLC, VLDL, TGL, TGLX, TRIGL, [id number], TRIGP, TGLPOCT, [id number], CHHD, CHHDX   BNP No results for input(s): BNPP in the last 72 hours. Liver Enzymes No results for input(s): TP, ALB, TBIL, AP, SGOT, GPT in the last 72 hours. No lab exists for component: DBIL   Thyroid Studies No results found for: T4, T3U, TSH, TSHEXT         Discharge Exam:  Visit Vitals    /73    Pulse 78    Temp 98.8 °F (37.1 °C)    Resp 22    Ht 5' 7\" (1.702 m)    Wt 87.1 kg (192 lb)    SpO2 98%    BMI 30.07 kg/m2     General appearance: alert, cooperative, no distress, appears stated age  Lungs: clear to auscultation bilaterally  Heart: regular rate and rhythm, S1, S2 normal, no murmur, click, rub or gallop  Abdomen: soft, non-tender.  Bowel sounds normal. No masses,  no organomegaly  Extremities: no cyanosis or edema  Neurologic: Grossly normal    Disposition: SNF  Discharge Condition: stable  Patient Instructions:   Current Discharge Medication List      STOP taking these medications       aspirin 81 mg chewable tablet Comments:   Reason for Stopping:         tamsulosin (FLOMAX) 0.4 mg capsule Comments:   Reason for Stopping:         omeprazole (PRILOSEC) 20 mg capsule Comments:   Reason for Stopping:         traZODone (DESYREL) 50 mg tablet Comments:   Reason for Stopping:         docusate sodium (COLACE) 100 mg capsule Comments:   Reason for Stopping:         NUTRITIONAL SUPPLEMENT (OSMOLITE 1.5 RUBEN PO) Comments:   Reason for Stopping:         trospium (SANCTURA) 20 mg tablet Comments:   Reason for Stopping: amLODIPine (NORVASC) 5 mg tablet Comments:   Reason for Stopping:         carvedilol (COREG) 12.5 mg tablet Comments:   Reason for Stopping:         clopidogrel (PLAVIX) 75 mg tab Comments:   Reason for Stopping:         finasteride (PROSCAR) 5 mg tablet Comments:   Reason for Stopping:         levETIRAcetam (KEPPRA) 100 mg/ml soln oral solution Comments:   Reason for Stopping:         losartan (COZAAR) 50 mg tablet Comments:   Reason for Stopping:         acetaminophen (TYLENOL) 500 mg tablet Comments:   Reason for Stopping:         carboxymethylcellulose sodium (CELLUVISC) 0.5 % drop ophthalmic solution Comments:   Reason for Stopping:               Activity: as tolerated  Diet:NPO except for comfort feeding (if family requests).      Time spent to discharge patient 35 minutes  Signed:  Kareem Patton DO  3/6/2017  12:01 PM

## 2017-03-07 LAB
BACTERIA SPEC CULT: NORMAL
BACTERIA SPEC CULT: NORMAL
SERVICE CMNT-IMP: NORMAL
SERVICE CMNT-IMP: NORMAL

## 2024-06-17 NOTE — PROGRESS NOTES
Dr. Giovanni Jarquin made aware of pt.'s rectal temperature slowly coming up. Will monitor. Temporary pacemaker inserted. Temporary pacemaker location through right internal jugular vein.